# Patient Record
Sex: MALE | Race: WHITE
[De-identification: names, ages, dates, MRNs, and addresses within clinical notes are randomized per-mention and may not be internally consistent; named-entity substitution may affect disease eponyms.]

---

## 2018-05-28 ENCOUNTER — HOSPITAL ENCOUNTER (EMERGENCY)
Dept: HOSPITAL 41 - JD.ED | Age: 33
Discharge: HOME | End: 2018-05-28
Payer: COMMERCIAL

## 2018-05-28 DIAGNOSIS — S01.01XA: Primary | ICD-10-CM

## 2018-05-28 DIAGNOSIS — Z23: ICD-10-CM
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
## 2018-05-28 NOTE — EDM.PDOC
ED HPI GENERAL MEDICAL PROBLEM





- General


Chief Complaint: Head Injury


Stated Complaint: HEAD INJURY


Time Seen by Provider: 05/28/18 16:03


Source of Information: Reports: Patient


History Limitations: Reports: No Limitations





- History of Present Illness


INITIAL COMMENTS - FREE TEXT/NARRATIVE: 





33-year-old male attends the ED after being struck in the occipital aspect of 

his head by falling object. This object was a argon or mercury vapor light is 

to let up a yard. These tend to be very heavy weighing upwards of 60-80 pounds. 

They removing the pole and it simply came off the portal falling and striking 

him in the head. He reports no loss of consciousness. No visual acuity changes. 

Denies any neck pain or any other pain. He has suffered a jagged laceration 

across 6 septal scalp which may 3 cm in size. Last tetanus toxoid is unknown. 

Patient denies any other injuries. Trauma minor was called on this patient.


Onset: Today


Onset Date: 05/28/18


Onset Time: 15:30


Duration: Minutes:


Location: Reports: Head (Injury to the supple aspect of his scalp.)


Quality: Reports: Ache


Severity: Moderate


Improves with: Reports: None


Worsens with: Reports: None


Context: Reports: Trauma (Struck by a falling object about 20 feet above his 

head.).  Denies: Activity, Exercise, Lifting, Sick Contact


Associated Symptoms: Reports: No Other Symptoms.  Denies: Confusion, Chest Pain

, Cough, cough w sputum, Diaphoresis, Fever/Chills, Headaches, Loss of Appetite

, Malaise, Nausea/Vomiting, Rash, Seizure, Shortness of Breath


Treatments PTA: Reports: Other (see below) (None.)





- Related Data


 Allergies











Allergy/AdvReac Type Severity Reaction Status Date / Time


 


No Known Allergies Allergy   Verified 05/28/18 15:52











Home Meds: 


 Home Meds





. [No Known Home Meds]  05/28/18 [History]











Past Medical History





- Past Health History


Medical/Surgical History: Denies Medical/Surgical History





Social & Family History





- Family History


Family Medical History: Noncontributory





- Tobacco Use


Smoking Status *Q: Never Smoker





- Recreational Drug Use


Recreational Drug Use: No





- Living Situation & Occupation


Living situation: Reports: 


Occupation: Employed (Works as a chiropractor.)





ED ROS GENERAL





- Review of Systems


Review Of Systems: See Below


Constitutional: Reports: No Symptoms


HEENT: Reports: No Symptoms


Respiratory: Reports: No Symptoms


Cardiovascular: Reports: No Symptoms


Endocrine: Reports: No Symptoms


GI/Abdominal: Reports: No Symptoms


: Reports: No Symptoms


Musculoskeletal: Reports: No Symptoms


Skin: Reports: No Symptoms


Neurological: Reports: No Symptoms


Psychiatric: Reports: No Symptoms


Hematologic/Lymphatic: Reports: No Symptoms


Immunologic: Reports: No Symptoms





ED EXAM, HEAD INJURY





- Physical Exam


Exam: See Below


Exam Limited By: No Limitations


General Appearance: Alert, WD/WN, No Apparent Distress


Head: Scalp Lacerations (Has a 3+ centimeter laceration across the mid 

occipital scalp. Wound is not actively bleeding.), Scalp Hematoma (Small 

surrounding scalp hematoma occipital scalp.)


Nexus Criteria: No: Posterior, Midline Cervical Tenderness, Evidence of 

Intoxication, Altered Level of Consciousness, Focal Neurological Deficit, 

Painful Distraction Injuries


Eyes: Bilateral Eye: Normal Inspection


Throat/Mouth: Normal Inspection, Normal Lips, Normal Teeth, Normal Oropharynx, 

Other


Neck: Non-Tender, Full Range of Motion (No bites to his tongue.), Normal 

Alignment, Normal Inspection, Other


Respiratory: No Respiratory Distress (Full unrestricted range of motion without 

any pain), Lungs Clear, Normal Breath Sounds, Other (No evidence of thoracic or 

chest wall trauma.)


Extremities: Normal Inspection, Normal Range of Motion, Non-Tender, No Pedal 

Edema


Neurologic: No Motor/Sensory Deficits, Alert, Normal Mood/Affect, Oriented x 3


Skin: Normal Color, Warm/Dry





- Vladimir Coma Score


Best Eye Response (Bradford): (4) Open Spontaneously


Best Verbal Response (Vladimir): (5) Oriented


Best Motor Response (Vladimir): (6) Obeys Commands


Bradford Total: 15





ED LACERATION/WOUND & CHELY PROC





- Laceration/Wound Repair


  ** Middle Head


Lac/wound length in cm: 2.5


Appearance: Subcutaneous (Midline occipital scalp), Clean


Distal NVT: Neuro & Vascular Intact


Local Anesthesia - Lidocaine (Xylocaine): 1% Plain


Local Anesthetic Volume: 3cc


Skin Prep: Saline


Closed with: Sutures


Suture Size: 4-0


# of Sutures: 5


Suture Type: Prolene





Course





- Vital Signs


Last Recorded V/S: 


 Last Vital Signs











Temp  36.6 C   05/28/18 16:16


 


Pulse  77   05/28/18 16:16


 


Resp  18   05/28/18 16:16


 


BP  113/81   05/28/18 16:16


 


Pulse Ox  97   05/28/18 16:16














- Orders/Labs/Meds


Orders: 


 Active Orders 24 hr











 Category Date Time Status


 


 Vaccines to be Administered [RC] PER UNIT ROUTINE Care  05/28/18 16:03 Active











Meds: 


Medications














Discontinued Medications














Generic Name Dose Route Start Last Admin





  Trade Name Renetta  PRN Reason Stop Dose Admin


 


Diphtheria/Tetanus/Acell Pertussis  0.5 ml  05/28/18 16:03  05/28/18 16:12





  Adacel  IM  05/28/18 16:04  0.5 ml





  .ONCE ONE   Administration





     





     





     





     


 


Lidocaine HCl  10 ml  05/28/18 16:32  05/28/18 16:45





  Xylocaine 1%  INJECT  05/28/18 16:33  Not Given





  ONETIME ONE   





     





     





     





     


 


Lidocaine HCl  50 ml  05/28/18 16:44  05/28/18 16:46





  Xylocaine 1%  INJECT  05/28/18 16:45  50 ml





  ONETIME ONE   Administration





     





     





     





     














- Radiology Interpretation


Free Text/Narrative:: 


33-year-old male attends the ED after being struck in the occipital aspect of 

the scalp by a falling object. Him and other family members were moving a yard 

light. Light unfortunately fell off the pole while the removing it and came 

down striking him in the back of the head. He did not lose consciousness. He 

suffered a 3 cm laceration across the mid aspect of his occipital scalp. 

Neurologically he is intact. There is no evidence of any cervical neck pain and 

therefore must been more of a glancing blow with a sharp edge. At any rate he 

will have CT head performed but CT neck is not indicated. He has no other 

injuries other than the laceration occipital scalp that will require suture. 

Prior. His tetanus diphtheria and pertussis vaccine will be updated today since 

its been about 10 years since the last vaccination.








- Re-Assessments/Exams


Free Text/Narrative Re-Assessment/Exam: 





05/28/18 16:33  CT head is within normal limits. Does show a laceration 

occipital scalp down to the galea. No skull fractures are evident. Plan Will 

proceed with wound repair using 4-0 Prolene suture and 1% lidocaine for 

anesthetic.





05/28/18 17:05  laceration repaired using 4-0 Prolene suture 5 sutures total. 

Patient tolerated procedure very well. Sutures will need to be removed in 10 

days' time.








Departure





- Departure


Time of Disposition: 17:02


Disposition: Home, Self-Care 01


Condition: Fair


Clinical Impression: 


Laceration of scalp


Qualifiers:


 Encounter type: initial encounter Qualified Code(s): S01.01XA - Laceration 

without foreign body of scalp, initial encounter








- Discharge Information


Referrals: 


Roc Yu MD [Primary Care Provider] - 


Forms:  ED Department Discharge


Additional Instructions: 


Evaluation the emergency room today in regards to injury to the occipital 

aspect of her scalp from a falling object. He suffered a 2.5 cm laceration 

across the occipital scalp which was cleansed and then sutured under local 

anesthetic. CT of the head proved to be negative for any intracranial bleeding 

or fractures of the skull. No apparent injuries to your neck or any other body 

parts on examination. Tetanus diphtheria and pertussis vaccine was updated 

today and her good for the next 10 years. Treatment of the wound is to daily 

cleanse the area with soap and water. Showering is okay. Shampoo is okay as 

well. May apply topical antibiotic such as bacitracin or Polysporin to the 

wound at least once daily. Sutures need to be removed in 10 days' time. Follow-

up is only indicated if you have any signs of wound infection developing such 

as redness, swelling or increased pain or pus.





- My Orders


Last 24 Hours: 


My Active Orders





05/28/18 16:03


Vaccines to be Administered [RC] PER UNIT ROUTINE 














- Assessment/Plan


Last 24 Hours: 


My Active Orders





05/28/18 16:03


Vaccines to be Administered [RC] PER UNIT ROUTINE

## 2018-05-28 NOTE — CT
Head CT

 

Technique: Multiple axial sections through the brain were obtained.  

Intravenous contrast was not utilized.

 

Comparison: No previous intracranial imaging is available.

 

Findings: Ventricles along with basal cisterns and sulci over the 

convexities are within normal limits for the patient's age.  No 

abnormal parenchymal densities are seen.  No evidence of intracranial 

hemorrhage.  No midline shift or mass effect is seen.

 

Bone window settings were reviewed which shows the visualized sinuses 

to appear clear.  No acute calvarial abnormality is identified.

 

Impression:

1.  No acute intracranial abnormality is identified on noncontrast 

head CT study.

 

Diagnostic code #1

## 2024-05-26 ENCOUNTER — HOSPITAL ENCOUNTER (OUTPATIENT)
Dept: CT IMAGING | Facility: HOSPITAL | Age: 39
Discharge: HOME | End: 2024-05-26
Payer: COMMERCIAL

## 2024-05-26 ENCOUNTER — OFFICE VISIT (OUTPATIENT)
Dept: URGENT CARE | Facility: CLINIC | Age: 39
End: 2024-05-26
Payer: COMMERCIAL

## 2024-05-26 VITALS
TEMPERATURE: 98.2 F | DIASTOLIC BLOOD PRESSURE: 71 MMHG | OXYGEN SATURATION: 98 % | RESPIRATION RATE: 16 BRPM | HEART RATE: 104 BPM | SYSTOLIC BLOOD PRESSURE: 115 MMHG | WEIGHT: 192 LBS

## 2024-05-26 DIAGNOSIS — R50.9 FEVER, UNSPECIFIED FEVER CAUSE: ICD-10-CM

## 2024-05-26 DIAGNOSIS — R10.30 LOWER ABDOMINAL PAIN: ICD-10-CM

## 2024-05-26 DIAGNOSIS — R19.7 DIARRHEA, UNSPECIFIED TYPE: Primary | ICD-10-CM

## 2024-05-26 LAB
ALBUMIN SERPL-MCNC: 4.4 G/DL (ref 3.5–5.3)
ALP SERPL-CCNC: 43 U/L (ref 45–115)
ALT SERPL-CCNC: 29 U/L (ref 7–52)
ANION GAP SERPL CALC-SCNC: 7 MMOL/L (ref 3–11)
AST SERPL-CCNC: 21 U/L
BACTERIA #/AREA URNS HPF: NORMAL /HPF
BASOPHILS # BLD AUTO: 0 10*3/UL
BASOPHILS NFR BLD AUTO: 0.3 % (ref 0–2)
BILIRUB SERPL-MCNC: 0.78 MG/DL (ref 0.2–1.4)
BILIRUB UR QL: NEGATIVE
BUN SERPL-MCNC: 20 MG/DL (ref 7–25)
CALCIUM ALBUM COR SERPL-MCNC: 8.9 MG/DL (ref 8.6–10.3)
CALCIUM SERPL-MCNC: 9.2 MG/DL (ref 8.6–10.3)
CHLORIDE SERPL-SCNC: 103 MMOL/L (ref 98–107)
CLARITY UR: CLEAR
CO2 SERPL-SCNC: 28 MMOL/L (ref 21–32)
COLOR UR: YELLOW
CREAT SERPL-MCNC: 1.05 MG/DL (ref 0.7–1.3)
CRP SERPL-MCNC: 3.4 MG/L
EGFRCR SERPLBLD CKD-EPI 2021: 93 ML/MIN/1.73M*2
EOSINOPHIL # BLD AUTO: 0.1 10*3/UL
EOSINOPHIL NFR BLD AUTO: 0.4 % (ref 0–3)
ERYTHROCYTE [DISTWIDTH] IN BLOOD BY AUTOMATED COUNT: 12.9 % (ref 11.5–15)
GLUCOSE SERPL-MCNC: 117 MG/DL (ref 70–105)
GLUCOSE UR QL: NEGATIVE MG/DL
HCT VFR BLD AUTO: 46.3 % (ref 38–50)
HGB BLD-MCNC: 15.7 G/DL (ref 13.2–17.2)
HGB UR QL: NEGATIVE
KETONES UR-MCNC: NEGATIVE MG/DL
LEUKOCYTE ESTERASE UR QL STRIP: NEGATIVE
LIPASE SERPL-CCNC: 32 U/L (ref 11–82)
LYMPHOCYTES # BLD AUTO: 0.4 10*3/UL
LYMPHOCYTES NFR BLD AUTO: 2.8 % (ref 15–47)
MCH RBC QN AUTO: 29.1 PG (ref 29–34)
MCHC RBC AUTO-ENTMCNC: 33.8 G/DL (ref 32–36)
MCV RBC AUTO: 86 FL (ref 82–97)
MONOCYTES # BLD AUTO: 0.8 10*3/UL
MONOCYTES NFR BLD AUTO: 5.9 % (ref 5–13)
NEUTROPHILS # BLD AUTO: 12.5 10*3/UL
NEUTROPHILS NFR BLD AUTO: 90.6 % (ref 46–70)
NITRITE UR QL: NEGATIVE
PH UR: 8.5 PH
PLATELET # BLD AUTO: 154 10*3/UL (ref 130–350)
PMV BLD AUTO: 8.4 FL (ref 6.9–10.8)
POTASSIUM SERPL-SCNC: 4.2 MMOL/L (ref 3.5–5.1)
PROT SERPL-MCNC: 6.3 G/DL (ref 6–8.3)
PROT UR STRIP-MCNC: NEGATIVE MG/DL
RBC # BLD AUTO: 5.38 10*6/ΜL (ref 4.1–5.8)
RBC #/AREA URNS HPF: NORMAL /HPF
SODIUM SERPL-SCNC: 138 MMOL/L (ref 135–145)
SP GR UR: 1.02 (ref 1–1.03)
SQUAMOUS #/AREA URNS HPF: NORMAL /HPF
UROBILINOGEN UR-MCNC: 1 MG/DL
WBC # BLD AUTO: 13.8 10*3/UL (ref 3.7–9.6)
WBC #/AREA URNS HPF: NORMAL /HPF

## 2024-05-26 PROCEDURE — 2550000100 HC RX 255: Mod: JZ | Performed by: PHYSICIAN ASSISTANT

## 2024-05-26 PROCEDURE — 74177 CT ABD & PELVIS W/CONTRAST: CPT

## 2024-05-26 PROCEDURE — 81001 URINALYSIS AUTO W/SCOPE: CPT | Performed by: PHYSICIAN ASSISTANT

## 2024-05-26 PROCEDURE — 86140 C-REACTIVE PROTEIN: CPT | Performed by: PHYSICIAN ASSISTANT

## 2024-05-26 PROCEDURE — 80053 COMPREHEN METABOLIC PANEL: CPT | Performed by: PHYSICIAN ASSISTANT

## 2024-05-26 PROCEDURE — 99203 OFFICE O/P NEW LOW 30 MIN: CPT | Performed by: PHYSICIAN ASSISTANT

## 2024-05-26 PROCEDURE — 36415 COLL VENOUS BLD VENIPUNCTURE: CPT | Performed by: PHYSICIAN ASSISTANT

## 2024-05-26 PROCEDURE — 83690 ASSAY OF LIPASE: CPT | Performed by: PHYSICIAN ASSISTANT

## 2024-05-26 PROCEDURE — 85025 COMPLETE CBC W/AUTO DIFF WBC: CPT | Performed by: PHYSICIAN ASSISTANT

## 2024-05-26 RX ORDER — IOPAMIDOL 755 MG/ML
105 INJECTION, SOLUTION INTRAVASCULAR ONCE
Status: COMPLETED | OUTPATIENT
Start: 2024-05-26 | End: 2024-05-26

## 2024-05-26 RX ADMIN — IOPAMIDOL 105 ML: 755 INJECTION, SOLUTION INTRAVENOUS at 12:30

## 2024-05-26 ASSESSMENT — ENCOUNTER SYMPTOMS
DIARRHEA: 1
COUGH: 0
NAUSEA: 1
DYSURIA: 0
HEMATURIA: 0
COLOR CHANGE: 0
ARTHRALGIAS: 0
BACK PAIN: 1
SORE THROAT: 0
CHILLS: 0
EYE PAIN: 0
SEIZURES: 0
ABDOMINAL PAIN: 1
SHORTNESS OF BREATH: 0
VOMITING: 0
FEVER: 0
PALPITATIONS: 0

## 2024-05-26 ASSESSMENT — PAIN SCALES - GENERAL: PAINLEVEL: 6

## 2024-05-26 NOTE — PROGRESS NOTES
Patient is returning call, please see message below.     Callback Number: [phone]  Best Availability: any  Can A Detailed Message Be Left? yes  Did you confirm the message with the caller?: yes Patient called in would like results from tests done weeks ago and can leave message on phone if does not answer with results.     Thank you,  Barbie Robbins     Subjective      Nicola Salas is a 39 y.o. male who presents for back pain, nausea, diarrhea, pain that radiates to the left testicle.    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  He states that he had some back pain yesterday that Radiated around to the left testicle which he thought was strange as he does not normally have back pain.  Today he became ill more by having epigastric discomfort that was treated by Pepto-Bismol which did help.  He also had some loose stools.  Decreased appetite but has been drinking.  States that his urine is dark however he has been drinking adequate amounts of fluid.  He is normally healthy.  He has no history of surgery.  No vomiting.  No black or blood in the stools.    The following have been reviewed and updated as appropriate in this visit:   Allergies  Meds  Problems  Med Hx  Surg Hx  Fam Hx         Allergies   Allergen Reactions    Other Rash     No current outpatient medications on file.     No current facility-administered medications for this visit.     History reviewed. No pertinent past medical history.  History reviewed. No pertinent surgical history.  History reviewed. No pertinent family history.  Social History     Socioeconomic History    Marital status:      Social Determinants of Health     Tobacco Use: Low Risk  (6/2/2020)    Received from [facility]    Patient History     Smoking Tobacco Use: Never     Smokeless Tobacco Use: Never       Review of Systems   Constitutional:  Negative for chills and fever.   HENT:  Negative for ear pain and sore throat.    Eyes:  Negative for pain and visual disturbance.   Respiratory:  Negative for cough and shortness of breath.    Cardiovascular:  Negative for chest pain and palpitations.   Gastrointestinal:  Positive for abdominal pain, diarrhea and nausea. Negative for vomiting.   Genitourinary:  Negative for dysuria and hematuria.   Musculoskeletal:   Positive for back pain. Negative for arthralgias.   Skin:  Negative for color change and rash.   Neurological:  Negative for seizures and syncope.   All other systems reviewed and are negative.      Objective   /71 (Patient Position: Sitting)   Pulse 104   Temp 36.8 °C (98.2 °F) (Temporal)   Resp 16   Wt 87.1 kg (192 lb)   SpO2 98%     Physical Exam  Vitals and nursing note reviewed.   Constitutional:       Appearance: Normal appearance.   HENT:      Head: Normocephalic.      Right Ear: Tympanic membrane, ear canal and external ear normal.      Left Ear: Tympanic membrane, ear canal and external ear normal.      Nose: Nose normal.      Mouth/Throat:      Mouth: Mucous membranes are moist.   Eyes:      Extraocular Movements: Extraocular movements intact.      Conjunctiva/sclera: Conjunctivae normal.      Pupils: Pupils are equal, round, and reactive to light.   Cardiovascular:      Rate and Rhythm: Regular rhythm. Tachycardia present.      Heart sounds: Normal heart sounds.   Pulmonary:      Effort: Pulmonary effort is normal.      Breath sounds: Normal breath sounds.   Abdominal:      Tenderness: There is abdominal tenderness (Minimal in the lower abdomen.  No rebound). There is no right CVA tenderness or left CVA tenderness.   Musculoskeletal:         General: Normal range of motion.      Cervical back: Normal range of motion.      Comments: No pain  to palpation of the vertebral processes.  No paraspinal muscle tenderness.  No lesions or rashes   Neurological:      General: No focal deficit present.      Mental Status: He is alert.         Recent Results (from the past 24 hour(s))   CBC w/auto differential Blood, Venous    Collection Time: 05/26/24 10:57 AM   Result Value Ref Range    WBC 13.8 (H) 3.7 - 9.6 10*3/uL    RBC 5.38 4.10 - 5.80 10*6/µL    Hemoglobin 15.7 13.2 - 17.2 g/dL    Hematocrit 46.3 38.0 - 50.0 %    MCV 86.0 82.0 - 97.0 fL    MCH 29.1 29.0 - 34.0 pg    MCHC 33.8 32.0 - 36.0 g/dL    RDW  12.9 11.5 - 15.0 %    Platelets 154 130 - 350 10*3/uL    MPV 8.4 6.9 - 10.8 fL    Neutrophils% 90.6 (H) 46.0 - 70.0 %    Lymphocytes% 2.8 (L) 15.0 - 47.0 %    Monocytes% 5.9 5.0 - 13.0 %    Eosinophils% 0.4 0.0 - 3.0 %    Basophils% 0.3 0.0 - 2.0 %    ANC (auto diff) 12.50 10*3/UL    Lymphocytes Absolute 0.40 10*3/uL    Monocytes Absolute 0.80 10*3/uL    Eosinophils Absolute 0.10 10*3/uL    Basophils Absolute 0.00 10*3/uL   Comprehensive metabolic panel Blood, Venous    Collection Time: 05/26/24 10:57 AM   Result Value Ref Range    Sodium 138 135 - 145 mmol/L    Potassium 4.2 3.5 - 5.1 MMOL/L    Chloride 103 98 - 107 mmol/L    CO2 28 21 - 32 mmol/L    Anion Gap 7 3 - 11 mmol/L    BUN 20 7 - 25 mg/dL    Creatinine 1.05 0.70 - 1.30 mg/dL    Glucose 117 (H) 70 - 105 mg/dL    Calcium 9.2 8.6 - 10.3 mg/dL    AST 21 <40 U/L    ALT (SGPT) 29 7 - 52 U/L    Alkaline Phosphatase 43 (L) 45 - 115 U/L    Total Protein 6.3 6.0 - 8.3 g/dL    Albumin 4.4 3.5 - 5.3 g/dL    Total Bilirubin 0.78 0.20 - 1.40 mg/dL    Corrected Calcium 8.9 8.6 - 10.3 mg/dL    eGFR 93 >60 mL/min/1.73m*2   Lipase Blood, Venous    Collection Time: 05/26/24 10:57 AM   Result Value Ref Range    Lipase 32 11 - 82 U/L   C-reactive protein (Inflammation) Blood, Venous    Collection Time: 05/26/24 10:57 AM   Result Value Ref Range    CRP 3.4 <=10.0 MG/L   Urinalysis, Dip (part 1 of panel) Urine, Clean Catch    Collection Time: 05/26/24 11:05 AM    Specimen: Urine, Clean Catch   Result Value Ref Range    Color, Urine Yellow Yellow    Clarity, Urine Clear Clear    pH, Urine 8.5 (H) 5.0 - 8.0 PH    Specific Gravity, Urine 1.020 1.003 - 1.030    Protein, Urine Negative Negative MG/DL    Glucose, Urine Negative Negative MG/DL    Ketones, Urine Negative Negative MG/DL    Blood, Urine Negative Negative    Nitrite, Urine Negative Negative    Bilirubin, Urine Negative Negative    Leukocytes, Urine Negative Negative    Urobilinogen, Urine 1.0 <2.0 mg/dL   Urinalysis,  Micro (part 2 of panel) Urine, Clean Catch    Collection Time: 05/26/24 11:05 AM    Specimen: Urine, Clean Catch   Result Value Ref Range    RBC, Urine 0-2 None seen, 0-2, Negative /HPF    WBC, Urine 0-4 0 - 4 /HPF    Squamous Epithelial, Urine 0-4 None Seen-9 /HPF    Bacteria, Urine None seen None seen, Few /HPF     CT scan with scattered left basilar pleural-based nodular densities measuring up to 6 mm.  Otherwise CT without any abnormal findings.  Interpreted by V rad  Assessment/Plan   Assessment/Plan             Will check labs and UA.  Urine is normal.  White count with left shift.  Concerned for infection process.  Will get a CT scan with iv contrast.  Continue to be NPO, return to the clinic   Diagnosis Plan   1. Diarrhea, unspecified type  CBC w/auto differential Blood, Venous    Urinalysis w/microscopic, reflex culture Urine, Clean Catch    Comprehensive metabolic panel Blood, Venous    Lipase Blood, Venous    C-reactive protein (Inflammation) Blood, Venous    CT abdomen pelvis with IV contrast      2. Fever, unspecified fever cause  CBC w/auto differential Blood, Venous    Urinalysis w/microscopic, reflex culture Urine, Clean Catch    Comprehensive metabolic panel Blood, Venous    Lipase Blood, Venous    C-reactive protein (Inflammation) Blood, Venous    CT abdomen pelvis with IV contrast      3. Lower abdominal pain  CT abdomen pelvis with IV contrast        Patient Instructions   You do have several small, 6 mm, nodules in the base of the left lung.  Follow-up with your primary care provider about monitoring these.  CT scan was normal.  White blood cell count was elevated however no source for infection was found.  This can be due to pain.  Follow-up with primary care to make sure this returns to normal.  Start out with a clear liquid diet.  He can advance as tolerated.  Return to ED if you are worsening over the next 12 to 36 hours.    JOSLYN Henry

## 2024-05-26 NOTE — PATIENT INSTRUCTIONS
You do have several small, 6 mm, nodules in the base of the left lung.  Follow-up with your primary care provider about monitoring these.  CT scan was normal.  White blood cell count was elevated however no source for infection was found.  This can be due to pain.  Follow-up with primary care to make sure this returns to normal.  Start out with a clear liquid diet.  He can advance as tolerated.  Return to ED if you are worsening over the next 12 to 36 hours.

## 2024-05-27 PROCEDURE — 74177 CT ABD & PELVIS W/CONTRAST: CPT | Mod: 26 | Performed by: INTERNAL MEDICINE
